# Patient Record
Sex: MALE | Race: WHITE | NOT HISPANIC OR LATINO | Employment: OTHER | URBAN - METROPOLITAN AREA
[De-identification: names, ages, dates, MRNs, and addresses within clinical notes are randomized per-mention and may not be internally consistent; named-entity substitution may affect disease eponyms.]

---

## 2018-09-25 PROBLEM — IMO0002 PROBLEM: Status: ACTIVE | Noted: 2018-09-25

## 2018-09-25 PROCEDURE — 81001 URINALYSIS AUTO W/SCOPE: CPT | Performed by: UROLOGY

## 2018-10-25 PROCEDURE — 88108 CYTOPATH CONCENTRATE TECH: CPT | Performed by: UROLOGY

## 2020-02-07 DIAGNOSIS — E78.00 HYPERCHOLESTEREMIA: Primary | ICD-10-CM

## 2020-04-18 ENCOUNTER — APPOINTMENT (OUTPATIENT)
Dept: CT IMAGING | Age: 50
End: 2020-04-18
Attending: PHYSICIAN ASSISTANT

## 2020-06-13 ENCOUNTER — APPOINTMENT (OUTPATIENT)
Dept: CT IMAGING | Age: 50
End: 2020-06-13
Attending: PHYSICIAN ASSISTANT

## 2020-06-18 ENCOUNTER — HOSPITAL ENCOUNTER (OUTPATIENT)
Dept: CT IMAGING | Age: 50
Discharge: HOME | End: 2020-06-18
Attending: PHYSICIAN ASSISTANT

## 2020-06-18 DIAGNOSIS — E78.00 HYPERCHOLESTEREMIA: ICD-10-CM

## 2020-06-18 PROCEDURE — 75571 CT HRT W/O DYE W/CA TEST: CPT

## 2020-06-19 ENCOUNTER — TELEPHONE (OUTPATIENT)
Dept: CT IMAGING | Age: 50
End: 2020-06-19

## 2021-01-01 ENCOUNTER — EXTERNAL RECORD (OUTPATIENT)
Dept: HEALTH INFORMATION MANAGEMENT | Facility: OTHER | Age: 51
End: 2021-01-01

## 2021-03-22 DIAGNOSIS — Z86.19 HISTORY OF COLD SORES: Primary | ICD-10-CM

## 2021-03-22 RX ORDER — VALACYCLOVIR HYDROCHLORIDE 1 G/1
TABLET, FILM COATED ORAL
Qty: 4 TABLET | OUTPATIENT
Start: 2021-03-22

## 2021-07-13 ENCOUNTER — LAB SERVICES (OUTPATIENT)
Dept: LAB | Age: 51
End: 2021-07-13

## 2021-07-13 ENCOUNTER — LAB REQUISITION (OUTPATIENT)
Dept: LAB | Age: 51
End: 2021-07-13

## 2021-07-13 DIAGNOSIS — Z00.00 ENCOUNTER FOR GENERAL ADULT MEDICAL EXAMINATION WITHOUT ABNORMAL FINDINGS: ICD-10-CM

## 2021-07-13 DIAGNOSIS — Z11.59 ENCOUNTER FOR SCREENING FOR OTHER VIRAL DISEASES: ICD-10-CM

## 2021-07-13 PROCEDURE — 86705 HEP B CORE ANTIBODY IGM: CPT | Performed by: CLINICAL MEDICAL LABORATORY

## 2021-07-13 PROCEDURE — G0103 PSA SCREENING: HCPCS | Performed by: CLINICAL MEDICAL LABORATORY

## 2021-07-13 PROCEDURE — 81001 URINALYSIS AUTO W/SCOPE: CPT | Performed by: CLINICAL MEDICAL LABORATORY

## 2021-07-13 PROCEDURE — 80050 GENERAL HEALTH PANEL: CPT | Performed by: CLINICAL MEDICAL LABORATORY

## 2021-07-13 PROCEDURE — 80061 LIPID PANEL: CPT | Performed by: CLINICAL MEDICAL LABORATORY

## 2021-07-14 LAB
ALBUMIN SERPL-MCNC: 3.7 G/DL (ref 3.6–5.1)
ALBUMIN/GLOB SERPL: 1.2 {RATIO} (ref 1–2.4)
ALP SERPL-CCNC: 59 UNITS/L (ref 45–117)
ALT SERPL-CCNC: 55 UNITS/L
ANION GAP SERPL CALC-SCNC: 12 MMOL/L (ref 10–20)
APPEARANCE UR: CLEAR
AST SERPL-CCNC: 28 UNITS/L
BACTERIA #/AREA URNS HPF: ABNORMAL /HPF
BASOPHILS # BLD: 0.1 K/MCL (ref 0–0.3)
BASOPHILS NFR BLD: 1 %
BILIRUB SERPL-MCNC: 0.5 MG/DL (ref 0.2–1)
BILIRUB UR QL STRIP: NEGATIVE
BUN SERPL-MCNC: 21 MG/DL (ref 6–20)
BUN/CREAT SERPL: 19 (ref 7–25)
CALCIUM SERPL-MCNC: 9.2 MG/DL (ref 8.4–10.2)
CHLORIDE SERPL-SCNC: 107 MMOL/L (ref 98–107)
CHOLEST SERPL-MCNC: 227 MG/DL
CHOLEST/HDLC SERPL: 3.9 {RATIO}
CO2 SERPL-SCNC: 25 MMOL/L (ref 21–32)
COLOR UR: YELLOW
CREAT SERPL-MCNC: 1.12 MG/DL (ref 0.67–1.17)
DEPRECATED RDW RBC: 39.8 FL (ref 39–50)
EOSINOPHIL # BLD: 0.3 K/MCL (ref 0–0.5)
EOSINOPHIL NFR BLD: 6 %
ERYTHROCYTE [DISTWIDTH] IN BLOOD: 11.7 % (ref 11–15)
FASTING DURATION TIME PATIENT: ABNORMAL H
FASTING DURATION TIME PATIENT: ABNORMAL H
GFR SERPLBLD BASED ON 1.73 SQ M-ARVRAT: 77 ML/MIN/1.73M2
GLOBULIN SER-MCNC: 3 G/DL (ref 2–4)
GLUCOSE SERPL-MCNC: 98 MG/DL (ref 65–99)
GLUCOSE UR STRIP-MCNC: NEGATIVE MG/DL
HBV CORE IGM SER QL: NEGATIVE
HCT VFR BLD CALC: 45.1 % (ref 39–51)
HDLC SERPL-MCNC: 58 MG/DL
HGB BLD-MCNC: 15.3 G/DL (ref 13–17)
HGB UR QL STRIP: ABNORMAL
HYALINE CASTS #/AREA URNS LPF: ABNORMAL /LPF
IMM GRANULOCYTES # BLD AUTO: 0 K/MCL (ref 0–0.2)
IMM GRANULOCYTES # BLD: 0 %
KETONES UR STRIP-MCNC: NEGATIVE MG/DL
LDLC SERPL CALC-MCNC: 127 MG/DL
LEUKOCYTE ESTERASE UR QL STRIP: NEGATIVE
LYMPHOCYTES # BLD: 1.8 K/MCL (ref 1–4.8)
LYMPHOCYTES NFR BLD: 40 %
MCH RBC QN AUTO: 32.2 PG (ref 26–34)
MCHC RBC AUTO-ENTMCNC: 33.9 G/DL (ref 32–36.5)
MCV RBC AUTO: 94.9 FL (ref 78–100)
MONOCYTES # BLD: 0.5 K/MCL (ref 0.3–0.9)
MONOCYTES NFR BLD: 11 %
MUCOUS THREADS URNS QL MICRO: PRESENT
NEUTROPHILS # BLD: 1.9 K/MCL (ref 1.8–7.7)
NEUTROPHILS NFR BLD: 42 %
NITRITE UR QL STRIP: NEGATIVE
NONHDLC SERPL-MCNC: 169 MG/DL
NRBC BLD MANUAL-RTO: 0 /100 WBC
PH UR STRIP: 5 [PH] (ref 5–7)
PLATELET # BLD AUTO: 287 K/MCL (ref 140–450)
POTASSIUM SERPL-SCNC: 4.4 MMOL/L (ref 3.4–5.1)
PROT SERPL-MCNC: 6.7 G/DL (ref 6.4–8.2)
PROT UR STRIP-MCNC: 100 MG/DL
PSA SERPL-MCNC: 0.55 NG/ML
RBC # BLD: 4.75 MIL/MCL (ref 4.5–5.9)
RBC #/AREA URNS HPF: ABNORMAL /HPF
SODIUM SERPL-SCNC: 140 MMOL/L (ref 135–145)
SP GR UR STRIP: 1.02 (ref 1–1.03)
SQUAMOUS #/AREA URNS HPF: ABNORMAL /HPF
TRIGL SERPL-MCNC: 211 MG/DL
TSH SERPL-ACNC: 2.68 MCUNITS/ML (ref 0.35–5)
UROBILINOGEN UR STRIP-MCNC: 0.2 MG/DL
WBC # BLD: 4.6 K/MCL (ref 4.2–11)
WBC #/AREA URNS HPF: ABNORMAL /HPF

## 2024-04-04 ENCOUNTER — APPOINTMENT (OUTPATIENT)
Dept: URBAN - METROPOLITAN AREA CLINIC 240 | Age: 54
Setting detail: DERMATOLOGY
End: 2024-04-04

## 2024-04-29 ENCOUNTER — APPOINTMENT (OUTPATIENT)
Dept: URBAN - METROPOLITAN AREA CLINIC 240 | Age: 54
Setting detail: DERMATOLOGY
End: 2024-04-29

## 2024-06-03 ENCOUNTER — APPOINTMENT (OUTPATIENT)
Dept: URBAN - METROPOLITAN AREA CLINIC 240 | Age: 54
Setting detail: DERMATOLOGY
End: 2024-06-03

## 2024-06-03 DIAGNOSIS — Z41.9 ENCOUNTER FOR PROCEDURE FOR PURPOSES OTHER THAN REMEDYING HEALTH STATE, UNSPECIFIED: ICD-10-CM

## 2024-06-03 PROCEDURE — OTHER LASER HAIR REMOVAL: OTHER

## 2024-06-03 ASSESSMENT — LOCATION DETAILED DESCRIPTION DERM
LOCATION DETAILED: RIGHT CYMBA CONCHA
LOCATION DETAILED: RIGHT SCAPHA
LOCATION DETAILED: LEFT CYMBA CONCHA
LOCATION DETAILED: RIGHT ANTERIOR EARLOBE
LOCATION DETAILED: LEFT SUPERIOR HELIX
LOCATION DETAILED: LEFT ANTITRAGUS

## 2024-06-03 ASSESSMENT — LOCATION ZONE DERM: LOCATION ZONE: EAR

## 2024-06-03 ASSESSMENT — LOCATION SIMPLE DESCRIPTION DERM
LOCATION SIMPLE: RIGHT EAR
LOCATION SIMPLE: LEFT EAR

## 2024-06-03 NOTE — PROCEDURE: LASER HAIR REMOVAL
Number Of Prepaid Treatments (Will Not Render If 0): 0
Render Post-Care In The Note: No
Post-Procedure Care: Immediate endpoint: perifollicular erythema and edema. Vaseline and ice applied. Post care reviewed with patient.
Pre-Procedure: Prior to proceedingf the treatment areas were cleaned and all present put on their eye protection.
Fluence (Will Not Render If 0): 20
Tolerated Procedure (Optional): 10 out of 10
Laser Type: Alexandrite 755nm
Shaving (Optional): The patient shaved at home
Post-Care Instructions: I reviewed with the patient in detail post-care instructions. Patient should avoid sun for a minimum of 4 weeks before and after treatment.
Consent: Written consent obtained, risks reviewed including but not limited to crusting, scabbing, blistering, scarring, darker or lighter pigmentary change, paradoxical hair regrowth, incomplete removal of hair and infection.
External Cooling: [employer] Cryo 6
Detail Level: Detailed
Comments: 1064: 23-25/30ms. Patient does not wear spf. Reminding best results are with lighter pigmented skin.

## 2024-07-01 ENCOUNTER — APPOINTMENT (OUTPATIENT)
Dept: URBAN - METROPOLITAN AREA CLINIC 240 | Age: 54
Setting detail: DERMATOLOGY
End: 2024-07-01

## 2024-07-01 DIAGNOSIS — Z41.9 ENCOUNTER FOR PROCEDURE FOR PURPOSES OTHER THAN REMEDYING HEALTH STATE, UNSPECIFIED: ICD-10-CM

## 2024-07-01 PROCEDURE — OTHER LASER HAIR REMOVAL: OTHER

## 2024-07-01 ASSESSMENT — LOCATION DETAILED DESCRIPTION DERM
LOCATION DETAILED: RIGHT CAVUM CONCHA
LOCATION DETAILED: LEFT CYMBA CONCHA

## 2024-07-01 ASSESSMENT — LOCATION SIMPLE DESCRIPTION DERM
LOCATION SIMPLE: LEFT EAR
LOCATION SIMPLE: RIGHT EAR

## 2024-07-01 ASSESSMENT — LOCATION ZONE DERM: LOCATION ZONE: EAR

## 2024-07-01 NOTE — PROCEDURE: LASER HAIR REMOVAL
Number Of Prepaid Treatments (Will Not Render If 0): 0
Fluence (Will Not Render If 0): 20
Laser Type: Alexandrite 755nm
Shaving (Optional): The patient shaved at home
External Cooling: [employer] Cryo 6
Consent: Written consent obtained, risks reviewed including but not limited to crusting, scabbing, blistering, scarring, darker or lighter pigmentary change, paradoxical hair regrowth, incomplete removal of hair and infection.
Render Post-Care In The Note: No
Post-Procedure Care: Immediate endpoint: perifollicular erythema and edema. Vaseline and ice applied. Post care reviewed with patient.
Pre-Procedure: Prior to proceedingf the treatment areas were cleaned and all present put on their eye protection.
Post-Care Instructions: I reviewed with the patient in detail post-care instructions. Patient should avoid sun for a minimum of 4 weeks before and after treatment.
Tolerated Procedure (Optional): 5 out of 10
Detail Level: Detailed

## 2024-07-29 ENCOUNTER — APPOINTMENT (OUTPATIENT)
Dept: URBAN - METROPOLITAN AREA CLINIC 240 | Age: 54
Setting detail: DERMATOLOGY
End: 2024-07-29

## 2024-07-29 DIAGNOSIS — Z41.9 ENCOUNTER FOR PROCEDURE FOR PURPOSES OTHER THAN REMEDYING HEALTH STATE, UNSPECIFIED: ICD-10-CM

## 2024-07-29 PROCEDURE — OTHER LASER HAIR REMOVAL: OTHER

## 2024-07-29 ASSESSMENT — LOCATION DETAILED DESCRIPTION DERM
LOCATION DETAILED: RIGHT INFERIOR CRUS OF ANTIHELIX
LOCATION DETAILED: LEFT CRUS OF HELIX

## 2024-07-29 ASSESSMENT — LOCATION ZONE DERM: LOCATION ZONE: EAR

## 2024-07-29 ASSESSMENT — LOCATION SIMPLE DESCRIPTION DERM
LOCATION SIMPLE: LEFT EAR
LOCATION SIMPLE: RIGHT EAR

## 2024-07-29 NOTE — PROCEDURE: LASER HAIR REMOVAL
Treatment Number: 0
Shaving (Optional): The patient shaved at home
Fluence (Will Not Render If 0): 20
Pre-Procedure: Prior to proceedingf the treatment areas were cleaned and all present put on their eye protection.
Render Post-Care In The Note: No
Post-Procedure Care: Immediate endpoint: perifollicular erythema and edema. Vaseline and ice applied. Post care reviewed with patient.
Consent: Written consent obtained, risks reviewed including but not limited to crusting, scabbing, blistering, scarring, darker or lighter pigmentary change, paradoxical hair regrowth, incomplete removal of hair and infection.
Tolerated Procedure (Optional): 5 out of 10
Laser Type: Alexandrite 755nm
Post-Care Instructions: I reviewed with the patient in detail post-care instructions. Patient should avoid sun for a minimum of 4 weeks before and after treatment.
Detail Level: Detailed
External Cooling: [employer] Cryo 6

## 2024-11-19 ENCOUNTER — APPOINTMENT (OUTPATIENT)
Dept: URBAN - METROPOLITAN AREA CLINIC 240 | Age: 54
Setting detail: DERMATOLOGY
End: 2024-11-19

## 2024-11-19 DIAGNOSIS — Z41.9 ENCOUNTER FOR PROCEDURE FOR PURPOSES OTHER THAN REMEDYING HEALTH STATE, UNSPECIFIED: ICD-10-CM

## 2024-11-19 PROCEDURE — OTHER LASER HAIR REMOVAL: OTHER

## 2024-11-19 NOTE — PROCEDURE: LASER HAIR REMOVAL
External Cooling: [employer] Cryo 6
Post-Care Instructions: I reviewed with the patient in detail post-care instructions. Patient should avoid sun for a minimum of 4 weeks before and after treatment.
Treatment Number: 0
Render Post-Care In The Note: No
Fluence (Will Not Render If 0): 20
Detail Level: Detailed
Tolerated Procedure (Optional): 5 out of 10
Pre-Procedure: Prior to proceedingf the treatment areas were cleaned and all present put on their eye protection.
Post-Procedure Care: Immediate endpoint: perifollicular erythema and edema. Vaseline and ice applied. Post care reviewed with patient.
Shaving (Optional): The patient shaved at home
Consent: Written consent obtained, risks reviewed including but not limited to crusting, scabbing, blistering, scarring, darker or lighter pigmentary change, paradoxical hair regrowth, incomplete removal of hair and infection.
Laser Type: Alexandrite 755nm

## 2025-03-17 ENCOUNTER — APPOINTMENT (OUTPATIENT)
Dept: URBAN - METROPOLITAN AREA CLINIC 240 | Age: 55
Setting detail: DERMATOLOGY
End: 2025-03-18

## 2025-03-17 DIAGNOSIS — Z41.9 ENCOUNTER FOR PROCEDURE FOR PURPOSES OTHER THAN REMEDYING HEALTH STATE, UNSPECIFIED: ICD-10-CM

## 2025-03-17 PROCEDURE — OTHER LASER HAIR REMOVAL: OTHER

## 2025-03-17 ASSESSMENT — LOCATION ZONE DERM
LOCATION ZONE: EAR
LOCATION ZONE: EAR

## 2025-03-17 ASSESSMENT — LOCATION SIMPLE DESCRIPTION DERM
LOCATION SIMPLE: LEFT EAR
LOCATION SIMPLE: RIGHT EAR

## 2025-03-17 ASSESSMENT — LOCATION DETAILED DESCRIPTION DERM
LOCATION DETAILED: RIGHT INFERIOR CRUS OF ANTIHELIX
LOCATION DETAILED: LEFT CRUS OF HELIX

## 2025-03-17 NOTE — PROCEDURE: LASER HAIR REMOVAL
Fluence (Will Not Render If 0): 20
Render Post-Care In The Note: No
Pre-Procedure: Prior to proceedingf the treatment areas were cleaned and all present put on their eye protection.
Number Of Prepaid Treatments (Will Not Render If 0): 0
Detail Level: Detailed
Consent: Written consent obtained, risks reviewed including but not limited to crusting, scabbing, blistering, scarring, darker or lighter pigmentary change, paradoxical hair regrowth, incomplete removal of hair and infection.
Post-Procedure Care: Immediate endpoint: perifollicular erythema and edema. Vaseline and ice applied. Post care reviewed with patient.
Post-Care Instructions: I reviewed with the patient in detail post-care instructions. Patient should avoid sun for a minimum of 4 weeks before and after treatment.
Laser Type: Alexandrite 755nm
Tolerated Procedure (Optional): 5 out of 10
Shaving (Optional): The patient shaved at home
External Cooling: [employer] Cryo 6